# Patient Record
Sex: MALE | Race: WHITE
[De-identification: names, ages, dates, MRNs, and addresses within clinical notes are randomized per-mention and may not be internally consistent; named-entity substitution may affect disease eponyms.]

---

## 2019-10-08 ENCOUNTER — HOSPITAL ENCOUNTER (INPATIENT)
Dept: HOSPITAL 11 - JP.ED | Age: 29
LOS: 2 days | Discharge: NURSING, REHAB OR LONG-TERM CARE | DRG: 43 | End: 2019-10-10
Attending: INTERNAL MEDICINE | Admitting: INTERNAL MEDICINE
Payer: COMMERCIAL

## 2019-10-08 DIAGNOSIS — J96.01: ICD-10-CM

## 2019-10-08 DIAGNOSIS — E66.9: ICD-10-CM

## 2019-10-08 DIAGNOSIS — J96.02: ICD-10-CM

## 2019-10-08 DIAGNOSIS — H54.7: ICD-10-CM

## 2019-10-08 DIAGNOSIS — I10: ICD-10-CM

## 2019-10-08 DIAGNOSIS — Z88.8: ICD-10-CM

## 2019-10-08 DIAGNOSIS — R00.0: ICD-10-CM

## 2019-10-08 DIAGNOSIS — G35: Primary | ICD-10-CM

## 2019-10-08 RX ADMIN — ONDANSETRON PRN MG: 2 INJECTION, SOLUTION INTRAMUSCULAR; INTRAVENOUS at 16:36

## 2019-10-08 RX ADMIN — ALBUTEROL SULFATE PRN MG: 2.5 SOLUTION RESPIRATORY (INHALATION) at 20:32

## 2019-10-08 NOTE — EDM.PDOC
ED HPI GENERAL MEDICAL PROBLEM





- General


Chief Complaint: Neurological Problem


Stated Complaint: MS FLARE UP


Time Seen by Provider: 10/08/19 12:38


Source of Information: Reports: Patient, Family, RN Notes Reviewed


History Limitations: Reports: No Limitations





- History of Present Illness


INITIAL COMMENTS - FREE TEXT/NARRATIVE: 





29-year-old gentleman presents to the emergency department today with complaint 

of numbness and tingling below his knees as well as his fingertips bilaterally, 

he has a known history of multiple sclerosis currently on Tysabri infusions. He 

states his had the symptoms for the last 48 hours he also feels very weak today 

feels he's had a change in voice as well. He did contact his neurologist 

recommend report to the emergency department for evaluation and then 

consultation


  ** Headache


Pain Score (Numeric/FACES): 3





- Related Data


 Allergies











Allergy/AdvReac Type Severity Reaction Status Date / Time


 


gadoteridol [From Prohance] Allergy Mild Itching Verified 10/08/19 11:46











Home Meds: 


 Home Meds





Natalizumab [Tysabri] 300 mg IV ASDIRECTED 10/08/19 [History]











Past Medical History


HEENT History: Reports: Impaired Vision


Neurological History: Reports: MS


Endocrine/Metabolic History: Reports: Obesity/BMI 30+





Social & Family History





- Tobacco Use


Smoking Status *Q: Never Smoker





- Caffeine Use


Caffeine Use: Reports: Coffee





- Recreational Drug Use


Recreational Drug Use: No





ED ROS GENERAL





- Review of Systems


Review Of Systems: See Below


Constitutional: Reports: Weakness


HEENT: Reports: No Symptoms


Respiratory: Reports: Cough


Cardiovascular: Reports: No Symptoms


GI/Abdominal: Reports: No Symptoms


: Reports: No Symptoms


Musculoskeletal: Reports: No Symptoms


Skin: Reports: No Symptoms


Neurological: Reports: Numbness, Tingling





ED EXAM, GENERAL





- Physical Exam


Exam: See Below


Exam Limited By: No Limitations


General Appearance: Alert, WD/WN, No Apparent Distress


Respiratory/Chest: No Respiratory Distress, Lungs Clear, Normal Breath Sounds, 

No Accessory Muscle Use, Chest Non-Tender


Cardiovascular: Regular Rate, Rhythm, No Murmur


GI/Abdominal: Soft, Non-Tender





Course





- Vital Signs


Last Recorded V/S: 


 Last Vital Signs











Temp  96.7 F   10/08/19 12:51


 


Pulse  107 H  10/08/19 12:51


 


Resp  20   10/08/19 12:51


 


BP  147/97 H  10/08/19 12:51


 


Pulse Ox  95   10/08/19 12:51














- Orders/Labs/Meds


Orders: 


 Active Orders 24 hr











 Category Date Time Status


 


 GLENN VIRUS DNA,PCR (WHOLE BLOOD) Routine Lab  10/08/19 13:25 Received


 


 UA W/MICROSCOPIC [URIN] Urgent Lab  10/08/19 12:43 Ordered


 


 methylPREDNISolone Sod Succ [SOLU-Medrol] 1,000 mg Med  10/08/19 15:00 Active





 Dextrose 5% in Water 100 ml   





 IV ONETIME   








 Medication Orders





Methylprednisolone Sodium Succinate 1,000 mg/ Dextrose/Water  100 mls @ 100 mls/

hr IV ONETIME ONE


   Stop: 10/08/19 15:59








Labs: 


 Laboratory Tests











  10/08/19 10/08/19 10/08/19 Range/Units





  13:01 13:01 13:01 


 


WBC  8.7    (4.5-11.0)  K/uL


 


RBC  5.03    (4.30-5.90)  M/uL


 


Hgb  15.0    (12.0-15.0)  g/dL


 


Hct  41.8    (40.0-54.0)  %


 


MCV  83    (80-98)  fL


 


MCH  30    (27-31)  pg


 


MCHC  36    (32-36)  %


 


Plt Count  255    (150-400)  K/uL


 


Neut % (Auto)  70 H    (36-66)  %


 


Lymph % (Auto)  21 L    (24-44)  %


 


Mono % (Auto)  8 H    (2-6)  %


 


Eos % (Auto)  1 L    (2-4)  %


 


Baso % (Auto)  1    (0-1)  %


 


Sodium   138 L   (140-148)  mmol/L


 


Potassium   4.2   (3.6-5.2)  mmol/L


 


Chloride   100   (100-108)  mmol/L


 


Carbon Dioxide   25   (21-32)  mmol/L


 


Anion Gap   17.2 H   (5.0-14.0)  mmol/L


 


BUN   10   (7-18)  mg/dL


 


Creatinine   1.0   (0.8-1.3)  mg/dL


 


Est Cr Clr Drug Dosing   128.50   mL/min


 


Estimated GFR (MDRD)   > 60   (>60)  


 


Glucose   121 H   ()  mg/dL


 


Lactic Acid    3.0 H  (0.4-2.0)  mmol/L


 


Calcium   9.2   (8.5-10.1)  mg/dL


 


Total Bilirubin   1.0   (0.2-1.0)  mg/dL


 


AST   29   (15-37)  U/L


 


ALT   56   (12-78)  U/L


 


Alkaline Phosphatase   53   ()  U/L


 


C-Reactive Protein     (0.0-0.3)  mg/dL


 


Total Protein   8.2   (6.4-8.2)  g/dL


 


Albumin   4.6   (3.4-5.0)  g/dL


 


Globulin   3.6 H   (2.3-3.5)  g/dL


 


Albumin/Globulin Ratio   1.3   (1.2-2.2)  














  10/08/19 Range/Units





  13:01 


 


WBC   (4.5-11.0)  K/uL


 


RBC   (4.30-5.90)  M/uL


 


Hgb   (12.0-15.0)  g/dL


 


Hct   (40.0-54.0)  %


 


MCV   (80-98)  fL


 


MCH   (27-31)  pg


 


MCHC   (32-36)  %


 


Plt Count   (150-400)  K/uL


 


Neut % (Auto)   (36-66)  %


 


Lymph % (Auto)   (24-44)  %


 


Mono % (Auto)   (2-6)  %


 


Eos % (Auto)   (2-4)  %


 


Baso % (Auto)   (0-1)  %


 


Sodium   (140-148)  mmol/L


 


Potassium   (3.6-5.2)  mmol/L


 


Chloride   (100-108)  mmol/L


 


Carbon Dioxide   (21-32)  mmol/L


 


Anion Gap   (5.0-14.0)  mmol/L


 


BUN   (7-18)  mg/dL


 


Creatinine   (0.8-1.3)  mg/dL


 


Est Cr Clr Drug Dosing   mL/min


 


Estimated GFR (MDRD)   (>60)  


 


Glucose   ()  mg/dL


 


Lactic Acid   (0.4-2.0)  mmol/L


 


Calcium   (8.5-10.1)  mg/dL


 


Total Bilirubin   (0.2-1.0)  mg/dL


 


AST   (15-37)  U/L


 


ALT   (12-78)  U/L


 


Alkaline Phosphatase   ()  U/L


 


C-Reactive Protein  0.04  (0.0-0.3)  mg/dL


 


Total Protein   (6.4-8.2)  g/dL


 


Albumin   (3.4-5.0)  g/dL


 


Globulin   (2.3-3.5)  g/dL


 


Albumin/Globulin Ratio   (1.2-2.2)  











Meds: 


Medications











Generic Name Dose Route Start Last Admin





  Trade Name Rudi  PRN Reason Stop Dose Admin


 


Methylprednisolone Sodium  100 mls @ 100 mls/hr  10/08/19 15:00  





Succinate 1,000 mg/ Dextrose/  IV  10/08/19 15:59  





Water  ONETIME ONE   





     





     





     





     














Departure





- Departure


Time of Disposition: 14:53


Disposition: Admitted As Inpatient 66


Condition: Fair


Clinical Impression: 


 MS (multiple sclerosis)








- Discharge Information


Referrals: 


Onel Steven NP [Primary Care Provider] - 


Forms:  ED Department Discharge


Additional Instructions: 


Appointment for MRI on Thursday 10/10/19 at 10:45 am. MRI brain


Appointment for MRI on Friday 10/11/19 at 8:30 am. MRI spine





- My Orders


Last 24 Hours: 


My Active Orders





10/08/19 12:43


UA W/MICROSCOPIC [URIN] Urgent 





10/08/19 13:25


GLENN VIRUS DNA,PCR (WHOLE BLOOD) Routine 





10/08/19 15:00


methylPREDNISolone Sod Succ [SOLU-Medrol] 1,000 mg   Dextrose 5% in Water 100 

ml IV ONETIME 














- Assessment/Plan


Last 24 Hours: 


My Active Orders





10/08/19 12:43


UA W/MICROSCOPIC [URIN] Urgent 





10/08/19 13:25


GLENN VIRUS DNA,PCR (WHOLE BLOOD) Routine 





10/08/19 15:00


methylPREDNISolone Sod Succ [SOLU-Medrol] 1,000 mg   Dextrose 5% in Water 100 

ml IV ONETIME 











Plan: 





Assessment





Acuity = acute





Site and laterality = exacerbation multiple sclerosis  





Etiology  = unknown etiology  





Manifestations = weakness, numbness and tingling in the extremities  





Location of injury =  Home





Lab values = CBC, CMP unremarkable lactic acid elevated 3.0 CRP unremarkable 

chest x-ray shows no acute process, urinalysis is pending  





Plan


Called discussed the case with Dr. Wilkerson neurologist at the [facility] neurology 

clinic at 1430  recommended 1 g steroids per day followed by MRI of brain 

cervical spine and thoracic spine with and without contrast. Called discussed 

case with hospitalist on-call at 1440 he kindly agreed to come and evaluate the 

patient in the emergency department for admission

















 This note was dictated using dragon voice recognition software please call 

with any questions on syntax or grammar.

## 2019-10-08 NOTE — CRLCR
INDICATION:



Shortness of breath 



COMPARISON:



None available. 



FINDINGS:



PA and lateral views of the chest were obtained. 



The lungs are clear. No focal or diffuse infiltrates are present.



The heart is normal in size. 



The mediastinum is normal in appearance. 



The osseous structures are normal in appearance for the patient`s age. 



IMPRESSION:



Normal chest 2 views.



Dictated by Erik Juarez MD @ Oct  8 2019  1:30PM



Signed by Dr. Erik Juarez @ Oct  8 2019  1:31PM

## 2019-10-08 NOTE — PCM.HP.2
H&P History of Present Illness





- General


Date of Service: 10/08/19


Admit Problem/Dx: 


 Admission Diagnosis/Problem





Admission Diagnosis/Problem      Multiple sclerosis








Source of Information: Patient, Provider


History Limitations: Reports: No Limitations





- History of Present Illness


Initial Comments - Free Text/Narative: 





CC: my body feels like it just had a cast 





HPI: John presents to the emergency room today with 2 days of progressive 

muscle weakness as well as numbness and tingling involving his hands and legs. 

He noticed mild symptoms on Sunday, 2 days ago and also had symptoms yesterday 

that were worse but he was still able to work. Today he is too weak to do much 

of anything. He is concerned about weakness involving his speech, swallowing as 

well as his larger proximal muscles in both upper and lower extremities. He has 

developed some numbness and pins and needles tingling that involves his hands 

and his legs from the knee distally. He has noticed mild coughing/choking when 

trying to drink water unless he drinks very small sips. He has a mild 

generalized headache. No complaints about blurry vision or double vision. He 

feels a little short of breath and this has been slowly progressive over the 

past 24 hours. No complaints of chest pain or abdominal pain. No nausea or 

vomiting. No change in bowel or bladder habits. This feels similar to his 

previous flareups of multiple sclerosis though this one is more intense than 

usual.





Workup in the emergency room has been reassuring so far with normal laboratory 

studies other than a mildly elevated lactic acid level. Vital signs are stable. 

Care was discussed with his neurologist and he recommended high-dose steroids 

and imaging of the brain, cervical spine and thoracic spine. He will be 

admitted to the hospital for further management.


  ** Headache


Pain Score (Numeric/FACES): 3





- Related Data


Allergies/Adverse Reactions: 


 Allergies











Allergy/AdvReac Type Severity Reaction Status Date / Time


 


gadoteridol [From Prohance] Allergy Mild Itching Verified 10/08/19 11:46











Home Medications: 


 Home Meds





Natalizumab [Tysabri] 300 mg IV ASDIRECTED 10/08/19 [History]











Past Medical History


HEENT History: Reports: Impaired Vision


Neurological History: Reports: MS


Endocrine/Metabolic History: Reports: Obesity/BMI 30+





Social & Family History





- Family History


Neurological: Denies: MS





- Tobacco Use


Smoking Status *Q: Never Smoker





- Caffeine Use


Caffeine Use: Reports: Coffee





- Alcohol Use


Alcohol Use History: Yes





- Recreational Drug Use


Recreational Drug Use: No





H&P Review of Systems





- Review of Systems:


Review Of Systems: See Below


Free Text/Narrative: 





A complete 12 point review of systems was obtained.  Pertinent positives and 

negatives are noted in the history of present illness.  All other systems were 

reviewed and were negative except as noted.





Exam





- Exam


Exam: See Below





- Vital Signs


Vital Signs: 


 Last Vital Signs











Temp  35.9 C   10/08/19 12:51


 


Pulse  105 H  10/08/19 15:07


 


Resp  18   10/08/19 15:07


 


BP  155/94 H  10/08/19 15:07


 


Pulse Ox  93 L  10/08/19 15:07











Weight: 136.985 kg





- Exam


Quality Assessment: No: Supplemental Oxygen


General: Alert, Oriented, Cooperative.  No: Mild Distress


HEENT: Conjunctiva Clear, Mucosa Moist & Pink.  No: Scleral Icterus


Neck: Supple, Trachea Midline.  No: Lymphadenopathy


Lungs: Clear to Auscultation, Normal Respiratory Effort


Cardiovascular: Regular Rate, Regular Rhythm.  No: Systolic Murmur


GI/Abdominal Exam: Normal Bowel Sounds, Soft, Non-Tender, No Distention


Extremities: No Pedal Edema.  No: Increased Warmth


Peripheral Pulses: 2+: Dorsalis Pedis (L), Dorsalis Pedis (R)


Skin: Warm, Dry.  No: Rash


Neuro Extensive - Mental Status: Alert, Oriented x3, Nl Response to Commands


Neuro Extensive - Motor, Sensory, Reflexes: CN II-XII Intact, Abnormal Motor (

strength is 5 x 5 and symmetric at the hands and wrists as well as plantar and 

dorsi flexion of the feet. Strength is 4+/5 with knee extension and flexion as 

well as hip flexion. Strength is 4+/5 with flexion at the biceps and extension 

at the triceps).  No: Dysarthria, Tremor


DTR: 0: Bicep (L), Bicep (R), Patella (L), Patella (R)


Psychiatric: Alert, Normal Affect





- Patient Data


Lab Results Last 24 hrs: 


 Laboratory Results - last 24 hr











  10/08/19 10/08/19 10/08/19 Range/Units





  13:01 13:01 13:01 


 


WBC  8.7    (4.5-11.0)  K/uL


 


RBC  5.03    (4.30-5.90)  M/uL


 


Hgb  15.0    (12.0-15.0)  g/dL


 


Hct  41.8    (40.0-54.0)  %


 


MCV  83    (80-98)  fL


 


MCH  30    (27-31)  pg


 


MCHC  36    (32-36)  %


 


Plt Count  255    (150-400)  K/uL


 


Neut % (Auto)  70 H    (36-66)  %


 


Lymph % (Auto)  21 L    (24-44)  %


 


Mono % (Auto)  8 H    (2-6)  %


 


Eos % (Auto)  1 L    (2-4)  %


 


Baso % (Auto)  1    (0-1)  %


 


Sodium   138 L   (140-148)  mmol/L


 


Potassium   4.2   (3.6-5.2)  mmol/L


 


Chloride   100   (100-108)  mmol/L


 


Carbon Dioxide   25   (21-32)  mmol/L


 


Anion Gap   17.2 H   (5.0-14.0)  mmol/L


 


BUN   10   (7-18)  mg/dL


 


Creatinine   1.0   (0.8-1.3)  mg/dL


 


Est Cr Clr Drug Dosing   128.50   mL/min


 


Estimated GFR (MDRD)   > 60   (>60)  


 


Glucose   121 H   ()  mg/dL


 


Lactic Acid    3.0 H  (0.4-2.0)  mmol/L


 


Calcium   9.2   (8.5-10.1)  mg/dL


 


Total Bilirubin   1.0   (0.2-1.0)  mg/dL


 


AST   29   (15-37)  U/L


 


ALT   56   (12-78)  U/L


 


Alkaline Phosphatase   53   ()  U/L


 


C-Reactive Protein     (0.0-0.3)  mg/dL


 


Total Protein   8.2   (6.4-8.2)  g/dL


 


Albumin   4.6   (3.4-5.0)  g/dL


 


Globulin   3.6 H   (2.3-3.5)  g/dL


 


Albumin/Globulin Ratio   1.3   (1.2-2.2)  


 


Urine Color     (YELLOW)  


 


Urine Appearance     (CLEAR)  


 


Urine pH     (5.0-8.0)  


 


Ur Specific Gravity     (1.008-1.030)  


 


Urine Protein     (NEGATIVE)  mg/dL


 


Urine Glucose (UA)     (NEGATIVE)  mg/dL


 


Urine Ketones     (NEGATIVE)  mg/dL


 


Urine Occult Blood     (NEGATIVE)  


 


Urine Nitrite     (NEGATIVE)  


 


Urine Bilirubin     (NEGATIVE)  


 


Urine Urobilinogen     (0.2-1.0)  EU/dL


 


Ur Leukocyte Esterase     (NEGATIVE)  


 


Urine RBC     (0-5)  


 


Urine WBC     (0-5)  


 


Ur Epithelial Cells     


 


Amorphous Sediment     


 


Urine Bacteria     


 


Urine Mucus     














  10/08/19 10/08/19 Range/Units





  13:01 14:49 


 


WBC    (4.5-11.0)  K/uL


 


RBC    (4.30-5.90)  M/uL


 


Hgb    (12.0-15.0)  g/dL


 


Hct    (40.0-54.0)  %


 


MCV    (80-98)  fL


 


MCH    (27-31)  pg


 


MCHC    (32-36)  %


 


Plt Count    (150-400)  K/uL


 


Neut % (Auto)    (36-66)  %


 


Lymph % (Auto)    (24-44)  %


 


Mono % (Auto)    (2-6)  %


 


Eos % (Auto)    (2-4)  %


 


Baso % (Auto)    (0-1)  %


 


Sodium    (140-148)  mmol/L


 


Potassium    (3.6-5.2)  mmol/L


 


Chloride    (100-108)  mmol/L


 


Carbon Dioxide    (21-32)  mmol/L


 


Anion Gap    (5.0-14.0)  mmol/L


 


BUN    (7-18)  mg/dL


 


Creatinine    (0.8-1.3)  mg/dL


 


Est Cr Clr Drug Dosing    mL/min


 


Estimated GFR (MDRD)    (>60)  


 


Glucose    ()  mg/dL


 


Lactic Acid    (0.4-2.0)  mmol/L


 


Calcium    (8.5-10.1)  mg/dL


 


Total Bilirubin    (0.2-1.0)  mg/dL


 


AST    (15-37)  U/L


 


ALT    (12-78)  U/L


 


Alkaline Phosphatase    ()  U/L


 


C-Reactive Protein  0.04   (0.0-0.3)  mg/dL


 


Total Protein    (6.4-8.2)  g/dL


 


Albumin    (3.4-5.0)  g/dL


 


Globulin    (2.3-3.5)  g/dL


 


Albumin/Globulin Ratio    (1.2-2.2)  


 


Urine Color   Yellow  (YELLOW)  


 


Urine Appearance   Clear  (CLEAR)  


 


Urine pH   7.0  (5.0-8.0)  


 


Ur Specific Gravity   1.025  (1.008-1.030)  


 


Urine Protein   Negative  (NEGATIVE)  mg/dL


 


Urine Glucose (UA)   Negative  (NEGATIVE)  mg/dL


 


Urine Ketones   Negative  (NEGATIVE)  mg/dL


 


Urine Occult Blood   Negative  (NEGATIVE)  


 


Urine Nitrite   Negative  (NEGATIVE)  


 


Urine Bilirubin   Negative  (NEGATIVE)  


 


Urine Urobilinogen   0.2  (0.2-1.0)  EU/dL


 


Ur Leukocyte Esterase   Negative  (NEGATIVE)  


 


Urine RBC   0-5  (0-5)  


 


Urine WBC   0-5  (0-5)  


 


Ur Epithelial Cells   Rare  


 


Amorphous Sediment   Not seen  


 


Urine Bacteria   Not seen  


 


Urine Mucus   Rare  











Result Diagrams: 


 10/08/19 13:01





 10/08/19 13:01





*Q Meaningful Use (ADM)





- VTE Risk Assess *Q


Each Risk Factor Represents 1 Point: Obesity ( BMI > 25 kg/m2)


Total Score 1 Point Risk Factors: 1


Each Risk Factor Represents 2 Points: None


Total Score 2 Point Risk Factors: 0


Each Risk Factor Represents 3 Points: None


Total Score 3 Point Risk Factors: 0


Each Risk Factor Represents 5 Points: None


Total Score 5 Point Risk Factors: 0


Venous Thromboembolism Risk Factor Score *Q: 1





- Problem List


(1) MS (multiple sclerosis)


SNOMED Code(s): 16564067


   ICD Code: G35 - MULTIPLE SCLEROSIS   Status: Acute   Current Visit: Yes   


Problem List Initiated/Reviewed/Updated: Yes


Orders Last 24hrs: 


 Active Orders 24 hr











 Category Date Time Status


 


 Patient Status Manage Transfer [TRANSFER] Routine ADT  10/08/19 15:47 Ordered


 


 GLENN VIRUS DNA,PCR (WHOLE BLOOD) Routine Lab  10/08/19 13:25 Received


 


 methylPREDNISolone Sod Succ [SOLU-Medrol] 1,000 mg Med  10/08/19 15:00 Active





 Dextrose 5% in Water 100 ml   





 IV ONETIME   


 


 Resuscitation Status Routine Resus Stat  10/08/19 15:48 Ordered








 Medication Orders





Methylprednisolone Sodium Succinate 1,000 mg/ Dextrose/Water  100 mls @ 100 mls/

hr IV ONETIME ONE


   Stop: 10/08/19 15:59


   Last Admin: 10/08/19 15:01  Dose: 100 mls/hr








Assessment/Plan Comment:: 





ASSESSMENT AND PLAN - 





Acute exacerbation of multiple sclerosis - baseline of relapsing and remitting 

MS. Symptoms have progressed over the past 48 hourd the patient is having 

difficulty with activities of daily living. His neurologist was contacted and 

he recommended high-dose steroids as well as advanced imaging with MRI. Patient 

is not safe for outpatient management at this time given his significant 

weakness and impairments.


-Solu-Medrol 1 g every 24 hours 3-5 days


-Transition to prednisone once more stable


-MRI of the brain, cervical spine and thoracic spine


-Consultation with his neurologist Dr Wilkerson ([phone]) once imaging is 

available





Maintenance issues - 


- DVT prophylaxis - mechanical


- GI prophylaxis - not indicated


- Nutrition - regular


- Kent catheter - not indicated





CODE STATUS - full code





Admission justification - This patient will be admitted for inpatient services 

and is medically appropriate meeting medical necessity for inpatient admission 

as outlined in my documentation.  I reasonably expect the patient will require 

inpatient services that span a period time over 2 midnights. I reasonably 

expect this patient to be discharged or transferred within 96 hours [facility]





Disposition - I would anticipate discharge home after the hospital stay





Adonay Salomon M.D.





- Mortality Measure


Prognosis:: Good

## 2019-10-09 RX ADMIN — LORAZEPAM PRN MG: 2 INJECTION INTRAMUSCULAR; INTRAVENOUS at 22:00

## 2019-10-09 RX ADMIN — ALBUTEROL SULFATE PRN MG: 2.5 SOLUTION RESPIRATORY (INHALATION) at 22:29

## 2019-10-09 RX ADMIN — ALBUTEROL SULFATE PRN MG: 2.5 SOLUTION RESPIRATORY (INHALATION) at 17:07

## 2019-10-09 RX ADMIN — ALBUTEROL SULFATE PRN MG: 2.5 SOLUTION RESPIRATORY (INHALATION) at 02:16

## 2019-10-09 RX ADMIN — LORAZEPAM PRN MG: 2 INJECTION INTRAMUSCULAR; INTRAVENOUS at 12:44

## 2019-10-09 RX ADMIN — ALBUTEROL SULFATE PRN MG: 2.5 SOLUTION RESPIRATORY (INHALATION) at 06:40

## 2019-10-09 RX ADMIN — ALBUTEROL SULFATE PRN MG: 2.5 SOLUTION RESPIRATORY (INHALATION) at 11:08

## 2019-10-09 RX ADMIN — ONDANSETRON PRN MG: 2 INJECTION, SOLUTION INTRAMUSCULAR; INTRAVENOUS at 15:48

## 2019-10-09 RX ADMIN — LORAZEPAM PRN MG: 2 INJECTION INTRAMUSCULAR; INTRAVENOUS at 17:38

## 2019-10-09 NOTE — PCM.PN
- General Info


Date of Service: 10/09/19


Subjective Update: 





Overnight the patient had difficulty with shortness of breath as well as some 

hypertension. He feels weaker today than yesterday. He is coughing a little bit 

but not producing any sputum. Repeat chest x-ray this morning did not show 

significant abnormalities. He feels too weak to stand. He has trouble even 

holding an emesis bag. Numbness and tingling and pins and needles sensation 

seemed to be worse today.


Functional Status: Reports: Pain Controlled





- Review of Systems


General: Reports: Weakness.  Denies: Fever


Pulmonary: Reports: Shortness of Breath


Neurological: Reports: Numbness, Paresthesia, Tingling, Trouble Speaking, 

Difficulty Walking, Weakness





- Patient Data


Vitals - Most Recent: 


 Last Vital Signs











Temp  36.1 C   10/09/19 03:00


 


Pulse  124 H  10/09/19 07:35


 


Resp  22 H  10/09/19 07:35


 


BP  152/79 H  10/09/19 07:35


 


Pulse Ox  2 L  10/09/19 07:35











Weight - Most Recent: 136.985 kg


I&O - Last 24 Hours: 


 Intake & Output











 10/08/19 10/09/19 10/09/19





 22:59 06:59 14:59


 


Output Total 100  


 


Balance -100  











Lab Results Last 24 Hours: 


 Laboratory Results - last 24 hr











  10/08/19 10/08/19 10/08/19 Range/Units





  13:01 13:01 13:01 


 


WBC  8.7    (4.5-11.0)  K/uL


 


RBC  5.03    (4.30-5.90)  M/uL


 


Hgb  15.0    (12.0-15.0)  g/dL


 


Hct  41.8    (40.0-54.0)  %


 


MCV  83    (80-98)  fL


 


MCH  30    (27-31)  pg


 


MCHC  36    (32-36)  %


 


Plt Count  255    (150-400)  K/uL


 


Neut % (Auto)  70 H    (36-66)  %


 


Lymph % (Auto)  21 L    (24-44)  %


 


Mono % (Auto)  8 H    (2-6)  %


 


Eos % (Auto)  1 L    (2-4)  %


 


Baso % (Auto)  1    (0-1)  %


 


Sodium   138 L   (140-148)  mmol/L


 


Potassium   4.2   (3.6-5.2)  mmol/L


 


Chloride   100   (100-108)  mmol/L


 


Carbon Dioxide   25   (21-32)  mmol/L


 


Anion Gap   17.2 H   (5.0-14.0)  mmol/L


 


BUN   10   (7-18)  mg/dL


 


Creatinine   1.0   (0.8-1.3)  mg/dL


 


Est Cr Clr Drug Dosing   128.50   mL/min


 


Estimated GFR (MDRD)   > 60   (>60)  


 


Glucose   121 H   ()  mg/dL


 


Lactic Acid    3.0 H  (0.4-2.0)  mmol/L


 


Calcium   9.2   (8.5-10.1)  mg/dL


 


Total Bilirubin   1.0   (0.2-1.0)  mg/dL


 


AST   29   (15-37)  U/L


 


ALT   56   (12-78)  U/L


 


Alkaline Phosphatase   53   ()  U/L


 


C-Reactive Protein     (0.0-0.3)  mg/dL


 


Total Protein   8.2   (6.4-8.2)  g/dL


 


Albumin   4.6   (3.4-5.0)  g/dL


 


Globulin   3.6 H   (2.3-3.5)  g/dL


 


Albumin/Globulin Ratio   1.3   (1.2-2.2)  


 


Urine Color     (YELLOW)  


 


Urine Appearance     (CLEAR)  


 


Urine pH     (5.0-8.0)  


 


Ur Specific Gravity     (1.008-1.030)  


 


Urine Protein     (NEGATIVE)  mg/dL


 


Urine Glucose (UA)     (NEGATIVE)  mg/dL


 


Urine Ketones     (NEGATIVE)  mg/dL


 


Urine Occult Blood     (NEGATIVE)  


 


Urine Nitrite     (NEGATIVE)  


 


Urine Bilirubin     (NEGATIVE)  


 


Urine Urobilinogen     (0.2-1.0)  EU/dL


 


Ur Leukocyte Esterase     (NEGATIVE)  


 


Urine RBC     (0-5)  


 


Urine WBC     (0-5)  


 


Ur Epithelial Cells     


 


Amorphous Sediment     


 


Urine Bacteria     


 


Urine Mucus     














  10/08/19 10/08/19 Range/Units





  13:01 14:49 


 


WBC    (4.5-11.0)  K/uL


 


RBC    (4.30-5.90)  M/uL


 


Hgb    (12.0-15.0)  g/dL


 


Hct    (40.0-54.0)  %


 


MCV    (80-98)  fL


 


MCH    (27-31)  pg


 


MCHC    (32-36)  %


 


Plt Count    (150-400)  K/uL


 


Neut % (Auto)    (36-66)  %


 


Lymph % (Auto)    (24-44)  %


 


Mono % (Auto)    (2-6)  %


 


Eos % (Auto)    (2-4)  %


 


Baso % (Auto)    (0-1)  %


 


Sodium    (140-148)  mmol/L


 


Potassium    (3.6-5.2)  mmol/L


 


Chloride    (100-108)  mmol/L


 


Carbon Dioxide    (21-32)  mmol/L


 


Anion Gap    (5.0-14.0)  mmol/L


 


BUN    (7-18)  mg/dL


 


Creatinine    (0.8-1.3)  mg/dL


 


Est Cr Clr Drug Dosing    mL/min


 


Estimated GFR (MDRD)    (>60)  


 


Glucose    ()  mg/dL


 


Lactic Acid    (0.4-2.0)  mmol/L


 


Calcium    (8.5-10.1)  mg/dL


 


Total Bilirubin    (0.2-1.0)  mg/dL


 


AST    (15-37)  U/L


 


ALT    (12-78)  U/L


 


Alkaline Phosphatase    ()  U/L


 


C-Reactive Protein  0.04   (0.0-0.3)  mg/dL


 


Total Protein    (6.4-8.2)  g/dL


 


Albumin    (3.4-5.0)  g/dL


 


Globulin    (2.3-3.5)  g/dL


 


Albumin/Globulin Ratio    (1.2-2.2)  


 


Urine Color   Yellow  (YELLOW)  


 


Urine Appearance   Clear  (CLEAR)  


 


Urine pH   7.0  (5.0-8.0)  


 


Ur Specific Gravity   1.025  (1.008-1.030)  


 


Urine Protein   Negative  (NEGATIVE)  mg/dL


 


Urine Glucose (UA)   Negative  (NEGATIVE)  mg/dL


 


Urine Ketones   Negative  (NEGATIVE)  mg/dL


 


Urine Occult Blood   Negative  (NEGATIVE)  


 


Urine Nitrite   Negative  (NEGATIVE)  


 


Urine Bilirubin   Negative  (NEGATIVE)  


 


Urine Urobilinogen   0.2  (0.2-1.0)  EU/dL


 


Ur Leukocyte Esterase   Negative  (NEGATIVE)  


 


Urine RBC   0-5  (0-5)  


 


Urine WBC   0-5  (0-5)  


 


Ur Epithelial Cells   Rare  


 


Amorphous Sediment   Not seen  


 


Urine Bacteria   Not seen  


 


Urine Mucus   Rare  











Med Orders - Current: 


 Current Medications





Acetaminophen (Tylenol)  650 mg PO Q4H PRN


   PRN Reason: Pain (Mild 1-3)/fever


   Last Admin: 10/08/19 18:14 Dose:  650 mg


Albuterol (Proventil Neb Soln)  2.5 mg NEB Q4H PRN


   PRN Reason: Shortness of Breath


   Last Admin: 10/09/19 06:40 Dose:  2.5 mg


Diphenhydramine HCl (Benadryl)  25 mg PO Q4H PRN


   PRN Reason: Other


   Last Admin: 10/09/19 04:38 Dose:  25 mg


Methylprednisolone Sodium Succinate 1,000 mg/ Dextrose/Water  100 mls @ 100 mls/

hr IV Q24H [facility]ABELLA


Sodium Chloride (Normal Saline)  1,000 mls @ 50 mls/hr IV ASDIRECTED [facility]


Ibuprofen (Motrin)  600 mg PO Q6H PRN


   PRN Reason: Pain/Fever


   Last Admin: 10/09/19 02:16 Dose:  600 mg


Lorazepam (Ativan)  0.5 mg IVPUSH Q4H PRN


   PRN Reason: Anxiety


Lorazepam (Ativan)  0.5 mg PO Q4H PRN


   PRN Reason: Anxiety


Magnesium Hydroxide (Milk Of Magnesia)  30 ml PO Q12H PRN


   PRN Reason: Constipation


Ondansetron HCl (Zofran Odt)  4 mg PO Q6H PRN


   PRN Reason: Nausea able to take PO


Ondansetron HCl (Zofran)  4 mg IV Q6H PRN


   PRN Reason: Nausea/Vomiting


   Last Admin: 10/08/19 16:36 Dose:  4 mg


Senna/Docusate Sodium (Senna Plus)  1 tab PO BID PRN


   PRN Reason: Constipation





Discontinued Medications





Methylprednisolone Sodium Succinate 1,000 mg/ Dextrose/Water  100 mls @ 100 mls/

hr IV ONETIME ONE


   Stop: 10/08/19 15:59


   Last Admin: 10/08/19 15:01 Dose:  100 mls/hr


Sodium Chloride (Normal Saline)  1,000 mls @ 100 mls/hr IV ASDIRECTED [facility]


   Last Admin: 10/09/19 01:44 Dose:  100 mls/hr











- Exam


Quality Assessment: Supplemental Oxygen


General: Alert, Oriented, Cooperative, Mild Distress


HEENT: Pupils Equal


Neck: Supple


Lungs: Clear to Auscultation.  No: Normal Respiratory Effort (mild increase in 

work of breathing )


Cardiovascular: Regular Rhythm, Tachycardia


GI/Abdominal Exam: Soft, No Distention


Extremities: No Pedal Edema.  No: Increased Warmth


Skin: Warm, Dry


Neurological: Strength Equal Bilateral, Other (moderate weakness of proximal 

muscles of legs and arms ).  No: Normal Speech


Psy/Mental Status: Alert, Normal Affect





- Problem List & Annotations


(1) MS (multiple sclerosis)


SNOMED Code(s): 02378498


   Code(s): G35 - MULTIPLE SCLEROSIS   Status: Acute   Current Visit: Yes   





- Problem List Review


Problem List Initiated/Reviewed/Updated: Yes





- My Orders


Last 24 Hours: 


My Active Orders





10/08/19 15:48


Resuscitation Status Routine 





10/08/19 16:11


Patient Status [ADT] Routine 


Antiembolic Devices [RC] .Routine 


Intake and Output [RC] QSHIFT 


Notify Provider Vital Signs [RC] ASDIRECTED 


Oxygen Therapy [RC] PRN 


Up With Assistance [RC] ASDIRECTED 


VTE/DVT Education [RC] Per Unit Routine 


Vital Signs [RC] Q4H 


Acetaminophen [Tylenol]   650 mg PO Q4H PRN 


Docusate Sodium/Sennosides [Senna Plus]   1 tab PO BID PRN 


Ibuprofen [Motrin]   600 mg PO Q6H PRN 


Magnesium Hydroxide [Milk of Magnesia]   30 ml PO Q12H PRN 


Ondansetron [Zofran ODT]   4 mg PO Q6H PRN 


Ondansetron [Zofran]   4 mg IV Q6H PRN 


Sequential Compression Device [OM.PC] Routine 





10/08/19 20:20


Albuterol [Proventil Neb Soln]   2.5 mg NEB Q4H PRN 





10/08/19 20:21


RT Aerosol Therapy [RC] ASDIRECTED 





10/08/19 Dinner


Regular Diet [DIET] 





10/09/19 04:17


diphenhydrAMINE [Benadryl]   25 mg PO Q4H PRN 





10/09/19 07:00


Brain w wo Cont [MR] Routine 


Cervical Spine Comp w wo Cont [MR] Routine 


Thoracic Spine Comp w wo Cont [MR] Routine 





10/09/19 08:12


EKG Documentation Completion [RC] ASDIRECTED 


EKG 12 Lead [EK] Urgent 





10/09/19 09:01


LORazepam [Ativan]   0.5 mg IVPUSH Q4H PRN 


LORazepam [Ativan]   0.5 mg PO Q4H PRN 





10/09/19 09:15


Sodium Chloride 0.9% [Normal Saline] 1,000 ml IV ASDIRECTED 





10/09/19 15:00


methylPREDNISolone Sod Succ [SOLU-Medrol] 1,000 mg   Dextrose 5% in Water 100 

ml IV Q24H 





10/10/19 05:00


BASIC METABOLIC PANEL,BMP [CHEM] Timed 


CBC W/O DIFF,HEMOGRAM [HEME] Timed (1) 














- Plan


Plan:: 





ASSESSMENT AND PLAN - 





Acute exacerbation of multiple sclerosis - baseline of relapsing and remitting 

MS. Symptoms seem to have progressed even from yesterday but it has been less 

than 24 hours and steroids were initiated. He is very weak. Voice is weak. 

Cough is weak.


-Solu-Medrol 1 g every 24 hours 3-5 days


-Transition to prednisone once more stable


-MRI of the brain, cervical spine and thoracic spine


-Consultation with his neurologist Dr Wilkerson ([phone]) once imaging is 

available





Maintenance issues - 


- DVT prophylaxis - mechanical


- GI prophylaxis - not indicated


- Nutrition - regular





Disposition - I would anticipate discharge home after the hospital stay





Adonay Salomon M.D.

## 2019-10-09 NOTE — CRLCR
INDICATION:



Shortness of breath.



TECHNIQUE:



Chest 1 view



COMPARISON:



Chest radiograph 10/08/2019.



FINDINGS:



Decreased lung expansion with very low lung volumes since prior exam. 

Bibasilar atelectasis. No definite focal consolidation or effusions. No 

pneumothorax. The cardiac silhouette is largely obscured. Crowding of the 

hilar vasculature due to low lung volumes.



IMPRESSION:



Low lung volumes with bibasilar atelectasis.



Dictated by Shanell Sharif MD @ Oct  9 2019  9:20AM



Signed by Dr. Shanell Sharif @ Oct  9 2019  9:22AM

## 2019-10-10 PROCEDURE — 0BH17EZ INSERTION OF ENDOTRACHEAL AIRWAY INTO TRACHEA, VIA NATURAL OR ARTIFICIAL OPENING: ICD-10-PCS | Performed by: INTERNAL MEDICINE

## 2019-10-10 PROCEDURE — 5A1935Z RESPIRATORY VENTILATION, LESS THAN 24 CONSECUTIVE HOURS: ICD-10-PCS | Performed by: INTERNAL MEDICINE

## 2019-10-10 RX ADMIN — ALBUTEROL SULFATE PRN MG: 2.5 SOLUTION RESPIRATORY (INHALATION) at 05:32

## 2019-10-10 RX ADMIN — LORAZEPAM PRN MG: 2 INJECTION INTRAMUSCULAR; INTRAVENOUS at 02:04

## 2019-10-10 NOTE — CRLCR
Indication:



Hypoxia 



Technique:



Portable AP chest radiograph



Comparison:



One day prior



Findings:



Low lung volumes. There are mild bibasilar pulmonary opacities which are 

similar to prior. Stable heart size. No pneumothorax. No new pulmonary 

opacities. No acute osseous abnormality. 



Impression:



Low lung volumes with stable bibasilar pulmonary opacities likely 

subsegmental atelectasis given low lung volumes..



Dictated by Wil Lewis MD @ Oct 10 2019  8:37AM



Signed by Dr. Wil Lewis @ Oct 10 2019  8:39AM

## 2019-10-10 NOTE — ANES
DATE OF SERVICE:  10/10/2019

 

I was called this morning by Dr. Salomon for a gentleman who was in respiratory failure with

MS flare up and was needing an emergency intubation.  I was at the bedside at approximately

8:40.  Vital signs were noted.  Heart rate was in the 120s.  O2 saturation was approximately

89% to 90% on 100% FiO2 on the BiPAP.  The patient was obtunded and did not respond to voice

command or painful stimuli.  Equipment was gathered.  Did intubation using a MAC 3 blade.

Grade 1 view was noted.  Cords were clear.  No signs of aspiration.  8.0 ET tube was placed

and secured at approximately 24.  Bilateral breath sounds were noted by Dr. Salomon.  A

positive end-tidal CO2 change was noted.  O2 saturations were up to 95% after intubation.

Respiratory Therapy was at the bedside and placed the patient on a ventilator.  Prior to

leaving, the patient's vital signs were stable.  O2 saturations did not drop with

intubation.  Dr. Salomon took back over care.  The patient will be transferred to [place] later

today.

 

 

 

 

Jeet Calderon CRNA

DD:  10/10/2019 11:51:44

DT:  10/10/2019 14:43:51

Job #:  [id number]

## 2019-10-10 NOTE — [id number]
**Discharge Summary





- Hospital Course


Brief History: 29 yr old male with relapsing and remitting MS who presented 

with weakness, numbess and tingling involving both upper and lower extremities. 

He was admitted for management of suspected MS flare.


Diagnosis: Stroke: No





- Discharge Data


Discharge Date: 10/10/19


Discharge Disposition: DC/Tfer to Acute Hospital 02


Condition: Critical





- Referral to Home Health


Primary Care Physician: 


Onel Steven NP








- Discharge Diagnosis/Problem(s)


(1) MS (multiple sclerosis)


SNOMED Code(s): 98159257


   ICD Code: G35 - MULTIPLE SCLEROSIS   Status: Acute   Current Visit: Yes   





- Patient Summary/Data


Hospital Course: 





John presented to the ER with two days of progressive generalized weakness and 

numbness and tingling involving both hands and legs below the knee. Laboratory 

studies in the emergency room were reassuring other than a mild elevation of 

the lactic acid. CRP was 0.04. Urinalysis was clear and chest x-ray was clear. 

Care was discussed with his neurologist Dr. Srinivasan at [facility] in the 

[facility] and it was thought this was likely a flare of his multiple 

sclerosis. He recommended high-dose steroids as well as advanced imaging 

involving the brain, cervical spine and thoracic spine. The patient was 

admitted to the hospital and overnight there were no acute issues. The morning 

after admission he was complaining of mild shortness of breath and felt a 

little bit weaker. He did require a small amount of supplemental oxygen 

throughout the day after admission but was otherwise stable and seemed to be 

doing a little bit better as the day went on. We were unable to complete an MRI 

of the day after admission because of the patient's inability to lay down. When 

he tried to lay flat he became very short of breath and anxious. We continued 

the high-dose steroids every 24 hours and he was receiving them at 3 PM. He did 

have some blood pressures that were up and down a little bit with ranges from 

150-190 systolic. Heart rate was also a little bit labile anywhere between 100 

and 140. Overnight prior to his transfer he had a more acute decompensation. 

Early this morning he was noted to be obtunded and hypoxic. Arterial blood 

gases were obtained and showed a pH of 6.9 with a PCO2 of 158. Chest x-ray 

showed low lung volumes but no infiltrate. Troponin level was undetectable. EKG 

showed a sinus tachycardia with no acute changes. Initially he was started on 

noninvasive ventilation and then transferred to the intensive care unit and 

intubated. Respiratory status has stabilized since intubation but he does 

remain on fairly high quantities of FiO2. Repeat arterial blood gases showed 

improvements in his pH up to 7.3 and his PCO2 was down to 60. Patient has been 

more alert and interactive as the PCO2 has been improving. He is receiving 

propofol for sedation and this is being titrated. Care was discussed with Dr Jefferson at [facility] in [place]. The plan is for transfer there for 

intensivist and neurology evaluation. I believe the benefits of transfer far 

outweigh the risks at this point. I am suspicious this is something other than 

multiple sclerosis with fairly rapid progression and diffuse symmetric weakness.





His wife does report that he DID HAVE a flu shot on September 27 of this year. 

He has not received any other vaccinations recently.





I spent 75 minutes of critical care time this morning with urgent evaluation, 

transfer to the ICU, ventilator adjustments and stabilization of the patient. 





- Patient Instructions


Diet: NPO


Activity: Bedrest


Other/Special Instructions: Transfer to [facility] - acute resp failure 

with hypoxia and hypercapnia





- Discharge Plan


*PRESCRIPTION DRUG MONITORING PROGRAM REVIEWED*: Not Applicable


*COPY OF PRESCRIPTION DRUG MONITORING REPORT IN PATIENT OSMAR: Not Applicable


Home Medications: 


 Home Meds





Natalizumab [Tysabri] 300 mg IV ASDIRECTED 10/08/19 [History]








Oxygen Therapy Mode: Mechanical Ventilation


Referrals: 


Onel Steven NP [Primary Care Provider] - 





- Discharge Summary/Plan Comment


DC Time >30 min.: Yes (45 - transfer to acute hospital )





- Patient Data


Vitals - Most Recent: 


 Last Vital Signs











Temp  35.8 C   10/10/19 09:41


 


Pulse  117 H  10/10/19 07:32


 


Resp  18   10/10/19 10:00


 


BP  172/107 H  10/10/19 10:00


 


Pulse Ox  90 L  10/10/19 10:00











Weight - Most Recent: 136.985 kg


I&O - Last 24 hours: 


 Intake & Output











 10/09/19 10/10/19 10/10/19





 22:59 06:59 14:59


 


Intake Total 909 10 


 


Output Total 850 200 


 


Balance 59 -190 











Lab Results - Last 24 hrs: 


 Laboratory Results - last 24 hr











  10/10/19 10/10/19 10/10/19 Range/Units





  06:03 06:03 07:39 


 


WBC  26.0 H    (4.5-11.0)  K/uL


 


RBC  4.94    (4.30-5.90)  M/uL


 


Hgb  14.8    (12.0-15.0)  g/dL


 


Hct  42.6    (40.0-54.0)  %


 


MCV  86    (80-98)  fL


 


MCH  30    (27-31)  pg


 


MCHC  35    (32-36)  %


 


Plt Count  347    (150-400)  K/uL


 


Puncture Site    Rt radial  


 


ABG pH    6.994 L*  (7.350-7.450)  


 


ABG pCO2    158.0 H*  (35.0-42.0)  mmHg


 


ABG pO2    66.1 L  (75.0-100.0)  mmHg


 


ABG HCO3    36.4 H  (22.0-26.0)  mmol/L


 


ABG Total CO2    36.1 H  (23.0-27.0)  mmol/L


 


ABG O2 Saturation    81.6 L  (95.0-98.0)  %


 


ABG O2 Content    17.2  (15.0-23.0)  %vol


 


ABG Base Excess    -2.8  mm/L


 


ABG Hemoglobin    15.5  (13.5-18.0)  g/dL


 


ABG Oxyhemoglobin    78.8  %


 


ABG Carboxyhemoglobin    2.3 H  (0.0-1.6)  %


 


ABG Methemoglobin    1.1  %


 


Kelvin Test    Passed  


 


O2 Delivery Device    Simple mask  


 


Oxygen Flow Rate    15  L


 


Sodium   137 L   (140-148)  mmol/L


 


Potassium   4.6   (3.6-5.2)  mmol/L


 


Chloride   99 L   (100-108)  mmol/L


 


Carbon Dioxide   32   (21-32)  mmol/L


 


Anion Gap   10.6   (5.0-14.0)  mmol/L


 


BUN   15   (7-18)  mg/dL


 


Creatinine   0.9   (0.8-1.3)  mg/dL


 


Est Cr Clr Drug Dosing   142.42   mL/min


 


Estimated GFR (MDRD)   > 60   (>60)  


 


Glucose   241 H   ()  mg/dL


 


Calcium   8.6   (8.5-10.1)  mg/dL


 


Troponin I     (0.000-0.056)  ng/mL














  10/10/19 10/10/19 Range/Units





  07:40 09:20 


 


WBC    (4.5-11.0)  K/uL


 


RBC    (4.30-5.90)  M/uL


 


Hgb    (12.0-15.0)  g/dL


 


Hct    (40.0-54.0)  %


 


MCV    (80-98)  fL


 


MCH    (27-31)  pg


 


MCHC    (32-36)  %


 


Plt Count    (150-400)  K/uL


 


Puncture Site   Rt radial  


 


ABG pH   7.299 L  (7.350-7.450)  


 


ABG pCO2   60.5 H  (35.0-42.0)  mmHg


 


ABG pO2   63.0 L  (75.0-100.0)  mmHg


 


ABG HCO3   28.8 H  (22.0-26.0)  mmol/L


 


ABG Total CO2   25.6  (23.0-27.0)  mmol/L


 


ABG O2 Saturation   91.4 L  (95.0-98.0)  %


 


ABG O2 Content   19.2  (15.0-23.0)  %vol


 


ABG Base Excess   1.1  mm/L


 


ABG Hemoglobin   15.5  (13.5-18.0)  g/dL


 


ABG Oxyhemoglobin   88.5  %


 


ABG Carboxyhemoglobin   2.4 H  (0.0-1.6)  %


 


ABG Methemoglobin   0.8  %


 


Kelvin Test   Passed  


 


O2 Delivery Device   Ventilator  


 


Oxygen Flow Rate     L


 


Sodium    (140-148)  mmol/L


 


Potassium    (3.6-5.2)  mmol/L


 


Chloride    (100-108)  mmol/L


 


Carbon Dioxide    (21-32)  mmol/L


 


Anion Gap    (5.0-14.0)  mmol/L


 


BUN    (7-18)  mg/dL


 


Creatinine    (0.8-1.3)  mg/dL


 


Est Cr Clr Drug Dosing    mL/min


 


Estimated GFR (MDRD)    (>60)  


 


Glucose    ()  mg/dL


 


Calcium    (8.5-10.1)  mg/dL


 


Troponin I  < 0.017   (0.000-0.056)  ng/mL











Med Orders - Current: 


 Current Medications





Acetaminophen (Tylenol)  650 mg PO Q4H PRN


   PRN Reason: Pain (Mild 1-3)/fever


   Last Admin: 10/08/19 18:14 Dose:  650 mg


Albuterol (Proventil Neb Soln)  2.5 mg NEB Q4H PRN


   PRN Reason: Shortness of Breath


   Last Admin: 10/10/19 05:32 Dose:  2.5 mg


Diphenhydramine HCl (Benadryl)  25 mg PO Q4H PRN


   PRN Reason: Other


   Last Admin: 10/09/19 04:38 Dose:  25 mg


Guaifenesin/Dextromethorphan (Robitussin Dm)  10 ml PO Q4H PRN


   PRN Reason: Cough


Methylprednisolone Sodium Succinate 1,000 mg/ Dextrose/Water  100 mls @ 100 mls/

hr IV Q24H ARABELLA


   Last Admin: 10/09/19 15:33 Dose:  100 mls/hr


Propofol (Diprivan 100 Ml)  100 mls @ 4.11 mls/hr IV TITRATE ARABELLA; Protocol


   Last Titration: 10/10/19 09:51 Dose:  50 mcg/kg/min, 41.096 mls/hr


Sodium Chloride (Normal Saline)  1,000 mls @ 25 mls/hr IV ASDIRECTED [facility]


   Last Admin: 10/10/19 09:08 Dose:  25 mls/hr


Ibuprofen (Motrin)  600 mg PO Q6H PRN


   PRN Reason: Pain/Fever


   Last Admin: 10/09/19 02:16 Dose:  600 mg


Insulin Human Lispro (Humalog)  0 unit SUBCUT Q6H ARABELLA; Protocol


   Last Admin: 10/10/19 09:36 Dose:  6 units


Lorazepam (Ativan)  0.5 mg IVPUSH Q4H PRN


   PRN Reason: Anxiety


   Last Admin: 10/10/19 02:04 Dose:  0.5 mg


Magnesium Hydroxide (Milk Of Magnesia)  30 ml PO Q12H PRN


   PRN Reason: Constipation


Ondansetron HCl (Zofran Odt)  4 mg PO Q6H PRN


   PRN Reason: Nausea able to take PO


Ondansetron HCl (Zofran)  4 mg IV Q6H PRN


   PRN Reason: Nausea/Vomiting


   Last Admin: 10/09/19 15:48 Dose:  4 mg


Senna/Docusate Sodium (Senna Plus)  1 tab PO BID PRN


   PRN Reason: Constipation





Discontinued Medications





Methylprednisolone Sodium Succinate 1,000 mg/ Dextrose/Water  100 mls @ 100 mls/

hr IV ONETIME ONE


   Stop: 10/08/19 15:59


   Last Admin: 10/08/19 15:01 Dose:  100 mls/hr


Sodium Chloride (Normal Saline)  1,000 mls @ 100 mls/hr IV ASDIRECTED [facility]


   Last Infusion: 10/09/19 08:25 Dose:  50 mls/hr


Sodium Chloride (Normal Saline)  1,000 mls @ 50 mls/hr IV ASDIRECTED [facility]


   Last Admin: 10/09/19 14:07 Dose:  50 mls/hr


Lorazepam (Ativan)  0.5 mg PO Q4H PRN


   PRN Reason: Anxiety